# Patient Record
Sex: FEMALE | Race: BLACK OR AFRICAN AMERICAN | Employment: OTHER | URBAN - METROPOLITAN AREA
[De-identification: names, ages, dates, MRNs, and addresses within clinical notes are randomized per-mention and may not be internally consistent; named-entity substitution may affect disease eponyms.]

---

## 2017-01-04 ENCOUNTER — OFFICE VISIT (OUTPATIENT)
Dept: ONCOLOGY | Age: 42
End: 2017-01-04

## 2017-01-04 VITALS
TEMPERATURE: 100.6 F | OXYGEN SATURATION: 97 % | BODY MASS INDEX: 35.34 KG/M2 | HEIGHT: 64 IN | DIASTOLIC BLOOD PRESSURE: 90 MMHG | WEIGHT: 207 LBS | HEART RATE: 104 BPM | SYSTOLIC BLOOD PRESSURE: 147 MMHG | RESPIRATION RATE: 20 BRPM

## 2017-01-04 DIAGNOSIS — C50.511 BREAST CANCER OF LOWER-OUTER QUADRANT OF RIGHT FEMALE BREAST (HCC): Primary | ICD-10-CM

## 2017-01-04 NOTE — MR AVS SNAPSHOT
Visit Information Date & Time Provider Department Dept. Phone Encounter #  
 1/4/2017 11:00 AM Sherwin Westbrook MD [facility] at [address] [place] [address] Rd [id number] Follow-up Instructions Return in about 5 months (around 6/5/2017) for scan results, sand/luz marina. Follow-up and Disposition History Upcoming Health Maintenance Date Due Pneumococcal 19-64 Highest Risk (1 of 3 - PCV13) 3/11/1992 DTaP/Tdap/Td series (1 - Tdap) 3/11/1994 PAP AKA CERVICAL CYTOLOGY 8/1/2016 INFLUENZA AGE 9 TO ADULT 8/1/2016 Allergies as of 1/4/2017  Review Complete On: 1/4/2017 By: Sherwin Westbrook MD  
  
 Severity Noted Reaction Type Reactions Latex  04/15/2014    Itching LATEX CONDOM USE CAUSES ITCHING Current Immunizations  Reviewed on 4/27/2016 Name Date Influenza Vaccine 12/8/2016, 1/1/2016 Not reviewed this visit You Were Diagnosed With   
  
 Codes Comments Breast cancer of lower-outer quadrant of right female breast ([facility]    -  Primary ICD-10-CM: C50.511 ICD-9-CM: 174.5 Vitals BP Pulse Temp Resp Height(growth percentile) Weight(growth percentile) 147/90 (!) 104 (!) 100.6 °F (38.1 °C) (Oral) 20 5' 4\" (1.626 m) 207 lb (93.9 kg) SpO2 BMI OB Status Smoking Status 97% 35.53 kg/m2 Medically Induced Never Smoker Vitals History BMI and BSA Data Body Mass Index Body Surface Area 35.53 kg/m 2 2.06 m 2 Preferred Pharmacy Pharmacy Name Phone [address] [facility] [phone] Your Updated Medication List  
  
   
This list is accurate as of: 1/4/17 12:49 PM.  Always use your most recent med list.  
  
  
  
  
 anastrozole 1 mg tablet Commonly known as:  ARIMIDEX Take 1 Tab by mouth daily. CALTRATE 600+D PLUS MINERALS 600 mg calcium- 800 unit-50 mg Tab Generic drug:  Ca-D3-mag#11-zinc-cupr-man-bor Take 1 Tab by mouth two (2) times a day. chlorpheniramine-HYDROcodone 10-8 mg/5 mL suspension Commonly known as:  Eulene Siemens Take 5 mL by mouth every twelve (12) hours as needed for Cough. Max Daily Amount: 10 mL. diphenhydrAMINE 25 mg capsule Commonly known as:  BENADRYL Take 25 mg by mouth every six (6) hours as needed. FULVESTRANT IM  
by IntraMUSCular route. Indications: every 28 days LORazepam 1 mg tablet Commonly known as:  ATIVAN Take 1 Tab by mouth nightly as needed for Anxiety. multivitamin tablet Commonly known as:  ONE A DAY Take 1 Tab by mouth daily. ondansetron hcl 8 mg tablet Commonly known as:  Dyllan Mater Take 1 Tab by mouth every eight (8) hours as needed for Nausea. * OTHER  
two (2) times a day. * OTHER Disabled [address] VI:G29.881 * Notice: This list has 2 medication(s) that are the same as other medications prescribed for you. Read the directions carefully, and ask your doctor or other care provider to review them with you. Follow-up Instructions Return in about 5 months (around 6/5/2017) for scan results, sand/luz marina. To-Do List   
 06/02/2017 Imaging:  CT CHEST ABD PELV W CONT   
  
 06/02/2017 Imaging:  NM BONE SCAN [facility] BODY Patient Instructions Come see us in 5 months with scans before. Introducing [facility] HE[facility]! Dear Pretty Likes: Thank you for requesting a [employer] account. Our records indicate that you already have an active [employer] account. You can access your account anytime at [email or web address] Did you know that you can access your hospital and ER discharge instructions at any time in [employer])? You can also review all of your test results from your hospital stay or ER visit. Additional Information If you have questions, please visit the Frequently Asked Questions section of the [employer] website at [email or web address] Remember, [employer] is NOT to be used for urgent needs. For medical emergencies, dial 911. Now available from your iPhone and Android! Please provide this summary of care documentation to your next provider. Your primary care clinician is listed as Phys Other. If you have any questions after today's visit, please call [phone].
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
## 2017-03-16 DIAGNOSIS — C50.511 BREAST CANCER OF LOWER-OUTER QUADRANT OF RIGHT FEMALE BREAST (HCC): Primary | ICD-10-CM

## 2017-03-16 RX ORDER — ANASTROZOLE 1 MG/1
1 TABLET ORAL DAILY
Qty: 90 TAB | Refills: 3 | Status: SHIPPED | OUTPATIENT
Start: 2017-03-16 | End: 2018-04-06 | Stop reason: SDUPTHER

## 2017-05-16 ENCOUNTER — HOSPITAL ENCOUNTER (OUTPATIENT)
Dept: NUCLEAR MEDICINE | Age: 42
Discharge: HOME | End: 2017-05-16
Attending: NURSE PRACTITIONER
Payer: MEDICARE

## 2017-05-16 ENCOUNTER — HOSPITAL ENCOUNTER (OUTPATIENT)
Dept: CT IMAGING | Age: 42
Discharge: HOME | End: 2017-05-16
Attending: NURSE PRACTITIONER
Payer: MEDICARE

## 2017-05-16 DIAGNOSIS — C50.511 BREAST CANCER OF LOWER-OUTER QUADRANT OF RIGHT FEMALE BREAST (HCC): ICD-10-CM

## 2017-05-16 PROCEDURE — 78306 BONE IMAGING WHOLE BODY: CPT

## 2017-05-16 PROCEDURE — 74011636320 HC RX REV CODE- 636/320: Performed by: RADIOLOGY

## 2017-05-16 PROCEDURE — 74177 CT ABD & PELVIS W/CONTRAST: CPT

## 2017-05-16 PROCEDURE — 77030003560 HC NDL HUBR BARD -A

## 2017-05-16 RX ORDER — HEPARIN 100 UNIT/ML
500 SYRINGE INTRAVENOUS
Status: DISPENSED | OUTPATIENT
Start: 2017-05-16 | End: 2017-05-17

## 2017-05-16 RX ADMIN — IOPAMIDOL 99 ML: 755 INJECTION, SOLUTION INTRAVENOUS at 14:17

## 2017-05-17 ENCOUNTER — OFFICE VISIT (OUTPATIENT)
Dept: ONCOLOGY | Age: 42
End: 2017-05-17

## 2017-05-17 VITALS
OXYGEN SATURATION: 99 % | TEMPERATURE: 96.8 F | HEART RATE: 86 BPM | HEIGHT: 64 IN | BODY MASS INDEX: 34.79 KG/M2 | SYSTOLIC BLOOD PRESSURE: 137 MMHG | DIASTOLIC BLOOD PRESSURE: 101 MMHG | RESPIRATION RATE: 18 BRPM | WEIGHT: 203.8 LBS

## 2017-05-17 DIAGNOSIS — R05.9 COUGH: ICD-10-CM

## 2017-05-17 DIAGNOSIS — C50.511 BREAST CANCER OF LOWER-OUTER QUADRANT OF RIGHT FEMALE BREAST (HCC): Primary | ICD-10-CM

## 2017-05-17 DIAGNOSIS — F43.10 PTSD (POST-TRAUMATIC STRESS DISORDER): ICD-10-CM

## 2017-05-17 DIAGNOSIS — M25.50 ARTHRALGIA, UNSPECIFIED JOINT: ICD-10-CM

## 2017-05-17 DIAGNOSIS — C50.919 METASTATIC BREAST CANCER (HCC): ICD-10-CM

## 2017-05-17 DIAGNOSIS — R23.2 HOT FLASHES RELATED TO AROMATASE INHIBITOR THERAPY: ICD-10-CM

## 2017-05-17 DIAGNOSIS — F41.9 ANXIETY: ICD-10-CM

## 2017-05-17 DIAGNOSIS — T45.1X5A HOT FLASHES RELATED TO AROMATASE INHIBITOR THERAPY: ICD-10-CM

## 2017-05-17 RX ORDER — ASCORBIC ACID 500 MG
500 TABLET ORAL DAILY
COMMUNITY
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
## 2017-05-17 NOTE — MR AVS SNAPSHOT
Visit Information Date & Time Provider Department Dept. Phone Encounter #  
 5/17/2017 11:30 AM MD Vivienne Gilmannhaverobbi Oncology at [address] [facility] [id number] Follow-up Instructions Return in about 6 months (around 11/17/2017) for 6m luz marina gilbert. Follow-up and Disposition History Your Appointments 11/22/2017  9:30 AM  
ESTABLISHED PATIENT with MD Edil Gilman Oncology at [id number] [place] [address] [address] [facility]) Appt Note: 6m luz marina gilbert [address] [facility], [address] [facility] [address]  
[phone]  
  
   
 [address] [facility], [address] [place] [address] Upcoming Health Maintenance Date Due Pneumococcal 19-64 Highest Risk (1 of 3 - PCV13) 3/11/1992 DTaP/Tdap/Td series (1 - Tdap) 3/11/1994 PAP AKA CERVICAL CYTOLOGY 8/1/2016 INFLUENZA AGE 9 TO ADULT 8/1/2017 Allergies as of 5/17/2017  Review Complete On: 5/17/2017 By: Alina Márquez MD  
  
 Severity Noted Reaction Type Reactions Latex  04/15/2014    Itching LATEX CONDOM USE CAUSES ITCHING Current Immunizations  Reviewed on 4/27/2016 Name Date Influenza Vaccine 12/8/2016, 1/1/2016 Not reviewed this visit You Were Diagnosed With   
  
 Codes Comments Breast cancer of lower-outer quadrant of right female breast ([facility]    -  Primary ICD-10-CM: C50.511 ICD-9-CM: 174.5 Anxiety     ICD-10-CM: F41.9 ICD-9-CM: 300.00 Cough     ICD-10-CM: R05 ICD-9-CM: 786.2 Hot flashes related to aromatase inhibitor therapy     ICD-10-CM: R23.2, T45.1X5A 
ICD-9-CM: 782.62, E933.1 Arthralgia, unspecified joint     ICD-10-CM: M25.50 ICD-9-CM: 719.40 PTSD (post-traumatic stress disorder)     ICD-10-CM: F43.10 ICD-9-CM: 309.81 Metastatic breast cancer ([facility]     ICD-10-CM: C50.919, C79.9 ICD-9-CM: 174.9, 199.1 Vitals BP Pulse Temp Resp Height(growth percentile) Weight(growth percentile) (!) 137/101 86 96.8 °F (36 °C) (Temporal) 18 5' 4\" (1.626 m) 203 lb 12.8 oz (92.4 kg) SpO2 BMI OB Status Smoking Status 99% 34.98 kg/m2 Medically Induced Never Smoker Vitals History BMI and BSA Data Body Mass Index Body Surface Area 34.98 kg/m 2 2.04 m 2 Preferred Pharmacy Pharmacy Name Phone [address] [facility] [phone] Your Updated Medication List  
  
   
This list is accurate as of: 5/17/17 12:37 PM.  Always use your most recent med list.  
  
  
  
  
 anastrozole 1 mg tablet Commonly known as:  ARIMIDEX Take 1 Tab by mouth daily. CALTRATE 600+D PLUS MINERALS 600 mg calcium- 800 unit-50 mg Tab Generic drug:  Ca-D3-mag#11-zinc-cupr-man-bor Take 1 Tab by mouth two (2) times a day. chlorpheniramine-HYDROcodone 10-8 mg/5 mL suspension Commonly known as:  Clifm Chasten Take 5 mL by mouth every twelve (12) hours as needed for Cough. Max Daily Amount: 10 mL. diphenhydrAMINE 25 mg capsule Commonly known as:  BENADRYL Take 25 mg by mouth every six (6) hours as needed. FULVESTRANT IM  
by IntraMUSCular route. Indications: every 28 days LORazepam 1 mg tablet Commonly known as:  ATIVAN Take 1 Tab by mouth nightly as needed for Anxiety. multivitamin tablet Commonly known as:  ONE A DAY Take 1 Tab by mouth daily. ondansetron hcl 8 mg tablet Commonly known as:  Radha Ross Take 1 Tab by mouth every eight (8) hours as needed for Nausea. * OTHER  
two (2) times a day. * OTHER Disabled Placard and License Plate OG:N22.107  
  
 VITAMIN C 500 mg tablet Generic drug:  ascorbic acid (vitamin C) Take 500 mg by mouth daily. * Notice: This list has 2 medication(s) that are the same as other medications prescribed for you. Read the directions carefully, and ask your doctor or other care provider to review them with you. Follow-up Instructions Return in about 6 months (around 11/17/2017) for 6m luz marina gilbert. To-Do List   
 11/13/2017 Imaging:  CT CHEST ABD PELV W CONT   
  
 11/13/2017 Imaging:  NM BONE SCAN [facility] BODY Introducing [facility] SERVICES! Dear Denis Fairbanks: Thank you for requesting a MySQL account. Our records indicate that you already have an active MySQL account. You can access your account anytime at [email or web address] Did you know that you can access your hospital and ER discharge instructions at any time in MySQL? You can also review all of your test results from your hospital stay or ER visit. Additional Information If you have questions, please visit the Frequently Asked Questions section of the MySQL website at [email or web address] Remember, MySQL is NOT to be used for urgent needs. For medical emergencies, dial 911. Now available from your iPhone and Android! Please provide this summary of care documentation to your next provider. Your primary care clinician is listed as Phys Other. If you have any questions after today's visit, please call [phone].

## 2017-12-19 ENCOUNTER — HOSPITAL ENCOUNTER (OUTPATIENT)
Dept: NUCLEAR MEDICINE | Age: 42
Discharge: HOME | End: 2017-12-19
Attending: NURSE PRACTITIONER
Payer: MEDICARE

## 2017-12-19 ENCOUNTER — HOSPITAL ENCOUNTER (OUTPATIENT)
Dept: CT IMAGING | Age: 42
Discharge: HOME | End: 2017-12-19
Attending: NURSE PRACTITIONER
Payer: MEDICARE

## 2017-12-19 DIAGNOSIS — T45.1X5A HOT FLASHES RELATED TO AROMATASE INHIBITOR THERAPY: ICD-10-CM

## 2017-12-19 DIAGNOSIS — R05.9 COUGH: ICD-10-CM

## 2017-12-19 DIAGNOSIS — C50.919 METASTATIC BREAST CANCER (HCC): ICD-10-CM

## 2017-12-19 DIAGNOSIS — R23.2 HOT FLASHES RELATED TO AROMATASE INHIBITOR THERAPY: ICD-10-CM

## 2017-12-19 DIAGNOSIS — F43.10 PTSD (POST-TRAUMATIC STRESS DISORDER): ICD-10-CM

## 2017-12-19 DIAGNOSIS — M25.50 ARTHRALGIA, UNSPECIFIED JOINT: ICD-10-CM

## 2017-12-19 DIAGNOSIS — C50.511 BREAST CANCER OF LOWER-OUTER QUADRANT OF RIGHT FEMALE BREAST (HCC): ICD-10-CM

## 2017-12-19 DIAGNOSIS — F41.9 ANXIETY: ICD-10-CM

## 2017-12-19 PROCEDURE — 78306 BONE IMAGING WHOLE BODY: CPT

## 2017-12-19 PROCEDURE — 74011250636 HC RX REV CODE- 250/636: Performed by: RADIOLOGY

## 2017-12-19 PROCEDURE — 74011636320 HC RX REV CODE- 636/320: Performed by: NURSE PRACTITIONER

## 2017-12-19 PROCEDURE — 74177 CT ABD & PELVIS W/CONTRAST: CPT

## 2017-12-19 PROCEDURE — 77030003560 HC NDL HUBR BARD -A

## 2017-12-19 RX ORDER — HEPARIN 100 UNIT/ML
500 SYRINGE INTRAVENOUS
Status: COMPLETED | OUTPATIENT
Start: 2017-12-19 | End: 2017-12-19

## 2017-12-19 RX ADMIN — SODIUM CHLORIDE, PRESERVATIVE FREE 500 UNITS: 5 INJECTION INTRAVENOUS at 13:48

## 2017-12-19 RX ADMIN — IOPAMIDOL 95 ML: 755 INJECTION, SOLUTION INTRAVENOUS at 13:48

## 2017-12-20 ENCOUNTER — OFFICE VISIT (OUTPATIENT)
Dept: ONCOLOGY | Age: 42
End: 2017-12-20

## 2017-12-20 VITALS
WEIGHT: 213.4 LBS | DIASTOLIC BLOOD PRESSURE: 103 MMHG | HEIGHT: 64 IN | HEART RATE: 82 BPM | OXYGEN SATURATION: 97 % | SYSTOLIC BLOOD PRESSURE: 136 MMHG | RESPIRATION RATE: 18 BRPM | BODY MASS INDEX: 36.43 KG/M2 | TEMPERATURE: 98 F

## 2017-12-20 DIAGNOSIS — C50.919 METASTATIC BREAST CANCER (HCC): Primary | ICD-10-CM

## 2017-12-20 DIAGNOSIS — Z17.0 MALIGNANT NEOPLASM OF LOWER-OUTER QUADRANT OF RIGHT BREAST OF FEMALE, ESTROGEN RECEPTOR POSITIVE (HCC): ICD-10-CM

## 2017-12-20 DIAGNOSIS — C50.511 MALIGNANT NEOPLASM OF LOWER-OUTER QUADRANT OF RIGHT BREAST OF FEMALE, ESTROGEN RECEPTOR POSITIVE (HCC): ICD-10-CM

## 2017-12-20 DIAGNOSIS — R23.2 HOT FLASHES RELATED TO AROMATASE INHIBITOR THERAPY: ICD-10-CM

## 2017-12-20 DIAGNOSIS — F41.9 ANXIETY: ICD-10-CM

## 2017-12-20 DIAGNOSIS — R05.9 COUGH: ICD-10-CM

## 2017-12-20 DIAGNOSIS — M25.50 ARTHRALGIA, UNSPECIFIED JOINT: ICD-10-CM

## 2017-12-20 DIAGNOSIS — F43.10 PTSD (POST-TRAUMATIC STRESS DISORDER): ICD-10-CM

## 2017-12-20 DIAGNOSIS — T45.1X5A HOT FLASHES RELATED TO AROMATASE INHIBITOR THERAPY: ICD-10-CM

## 2017-12-20 NOTE — MR AVS SNAPSHOT
Visit Information Date & Time Provider Department Dept. Phone Encounter #  
 12/20/2017  9:30 AM [facility]ilva [facility] [place] [id number] [phone] Follow-up Instructions Return for 7m luz marina gilbert. Upcoming Health Maintenance Date Due Pneumococcal 19-64 Highest Risk (1 of 3 - PCV13) 3/11/1992 DTaP/Tdap/Td series (1 - Tdap) 3/11/1994 PAP AKA CERVICAL CYTOLOGY 8/1/2016 Influenza Age 5 to Adult 8/1/2017 Allergies as of 12/20/2017  Review Complete On: 12/20/2017 By: Salome Dasilva MD  
  
 Severity Noted Reaction Type Reactions Latex  04/15/2014    Itching LATEX CONDOM USE CAUSES ITCHING Current Immunizations  Reviewed on 4/27/2016 Name Date Influenza Vaccine 11/24/2017, 12/8/2016, 1/1/2016 Not reviewed this visit You Were Diagnosed With   
  
 Codes Comments Metastatic breast cancer ([facility]    -  Primary ICD-10-CM: Y07.411 ICD-9-CM: 174.9 Anxiety     ICD-10-CM: F41.9 ICD-9-CM: 300.00 Hot flashes related to aromatase inhibitor therapy     ICD-10-CM: R23.2, T45.1X5A 
ICD-9-CM: 782.62, E933.1 Arthralgia, unspecified joint     ICD-10-CM: M25.50 ICD-9-CM: 719.40 Cough     ICD-10-CM: R05 ICD-9-CM: 786.2 PTSD (post-traumatic stress disorder)     ICD-10-CM: F43.10 ICD-9-CM: 309.81 Malignant neoplasm of lower-outer quadrant of right breast of female, estrogen receptor positive ([facility]     ICD-10-CM: C50.511, Z17.0 ICD-9-CM: 174.5, V86.0 Vitals BP Pulse Temp Resp Height(growth percentile) Weight(growth percentile) (!) 136/103 82 98 °F (36.7 °C) (Temporal) 18 5' 4\" (1.626 m) 213 lb 6.4 oz (96.8 kg) SpO2 BMI OB Status Smoking Status 97% 36.63 kg/m2 Medically Induced Never Smoker Vitals History BMI and BSA Data Body Mass Index Body Surface Area  
 36.63 kg/m 2 2.09 m 2 [phone] Your Updated Medication List  
  
   
This list is accurate as of: 12/20/17 10:38 AM.  Always use your most recent med list.  
  
  
  
  
 anastrozole 1 mg tablet Commonly known as:  ARIMIDEX Take 1 Tab by mouth daily. CALTRATE 600+D PLUS MINERALS 600 mg calcium- 800 unit-50 mg Tab Generic drug:  fyu-A1-jgl95-zinc--cheko-bor Take 1 Tab by mouth two (2) times a day. chlorpheniramine-HYDROcodone 10-8 mg/5 mL suspension Commonly known as:  Ivory Cheeks Take 5 mL by mouth every twelve (12) hours as needed for Cough. Max Daily Amount: 10 mL. diphenhydrAMINE 25 mg capsule Commonly known as:  BENADRYL Take 25 mg by mouth every six (6) hours as needed. FULVESTRANT IM  
by IntraMUSCular route. Indications: every 28 days LORazepam 1 mg tablet Commonly known as:  ATIVAN Take 1 Tab by mouth nightly as needed for Anxiety. multivitamin tablet Commonly known as:  ONE A DAY Take 1 Tab by mouth daily. ondansetron hcl 8 mg tablet Commonly known as:  Lutricia Rye Take 1 Tab by mouth every eight (8) hours as needed for Nausea. * OTHER  
two (2) times a day. * OTHER Disabled Placard and License Plate IX:Y10.152  
  
 VITAMIN C 500 mg tablet Generic drug:  ascorbic acid (vitamin C) Take 500 mg by mouth daily. * Notice: This list has 2 medication(s) that are the same as other medications prescribed for you. Read the directions carefully, and ask your doctor or other care provider to review them with you. Follow-up Instructions Return for 7m luz marina gilbert. To-Do List   
 07/10/2018 Imaging:  CT CHEST ABD PELV W CONT   
  
 07/10/2018 Imaging:  NM BONE SCAN [facility] BODY Introducing [facility] & HEALTH SERVICES! Dear Kike Church: Thank you for requesting a [employer] account.   Our records indicate that you already have an active [employer] account. You can access your account anytime at [email or web address] Did you know that you can access your hospital and ER discharge instructions at any time in [employer]? You can also review all of your test results from your hospital stay or ER visit. Additional Information If you have questions, please visit the Frequently Asked Questions section of the [employer] website at [email or web address] Remember, [employer] is NOT to be used for urgent needs. For medical emergencies, dial 911. Now available from your iPhone and Android! Please provide this summary of care documentation to your next provider. Your primary care clinician is listed as Phys Other. If you have any questions after today's visit, please call [phone].
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
## 2018-04-09 RX ORDER — ANASTROZOLE 1 MG/1
TABLET ORAL
Qty: 90 TAB | Refills: 3 | Status: SHIPPED | OUTPATIENT
Start: 2018-04-09 | End: 2019-03-03 | Stop reason: SDUPTHER

## 2018-06-27 ENCOUNTER — TELEPHONE (OUTPATIENT)
Dept: ONCOLOGY | Age: 43
End: 2018-06-27

## 2018-06-27 DIAGNOSIS — C50.919 METASTATIC BREAST CANCER (HCC): ICD-10-CM

## 2018-06-27 DIAGNOSIS — C50.511 MALIGNANT NEOPLASM OF LOWER-OUTER QUADRANT OF RIGHT FEMALE BREAST, UNSPECIFIED ESTROGEN RECEPTOR STATUS (HCC): Primary | ICD-10-CM

## 2018-06-27 NOTE — TELEPHONE ENCOUNTER
Patient would like to know if she needs a mammogram. \"local oncologist recommended a mammogram, what does Dr. Kristi Bynum think? \"# [phone]

## 2018-06-27 NOTE — TELEPHONE ENCOUNTER
Called the patient and verified ID x 2. Informed the patient that Dr. Hortensia Zapata is agreeable to ordering a mammogram and that an order will be placed for a mammogram.  The patient stated that she has a bone scan and CT scheduled on 8/20/18 and that she will be driving from [facility] on 8/20/18 and requested that the mammogram be scheduled on 8/20/18 around 4:00 pm or on the earliest appointment time on 8/21/18 so that the results will be available for her appointment with Dr. Hortensia Zapata on 8/22/18. Informed the patient that this office will call the schedulers and inform them of the above information. The patient verbalized understanding and denied any further questions or concerns.

## 2018-08-20 ENCOUNTER — HOSPITAL ENCOUNTER (OUTPATIENT)
Dept: MAMMOGRAPHY | Age: 43
Discharge: HOME | End: 2018-08-20
Attending: INTERNAL MEDICINE
Payer: MEDICARE

## 2018-08-20 ENCOUNTER — HOSPITAL ENCOUNTER (OUTPATIENT)
Dept: CT IMAGING | Age: 43
Discharge: HOME | End: 2018-08-20
Attending: NURSE PRACTITIONER
Payer: MEDICARE

## 2018-08-20 ENCOUNTER — HOSPITAL ENCOUNTER (OUTPATIENT)
Dept: NUCLEAR MEDICINE | Age: 43
Discharge: HOME | End: 2018-08-20
Attending: NURSE PRACTITIONER
Payer: MEDICARE

## 2018-08-20 DIAGNOSIS — M25.50 ARTHRALGIA, UNSPECIFIED JOINT: ICD-10-CM

## 2018-08-20 DIAGNOSIS — T45.1X5A HOT FLASHES RELATED TO AROMATASE INHIBITOR THERAPY: ICD-10-CM

## 2018-08-20 DIAGNOSIS — C50.919 METASTATIC BREAST CANCER (HCC): ICD-10-CM

## 2018-08-20 DIAGNOSIS — F41.9 ANXIETY: ICD-10-CM

## 2018-08-20 DIAGNOSIS — C50.511 MALIGNANT NEOPLASM OF LOWER-OUTER QUADRANT OF RIGHT BREAST OF FEMALE, ESTROGEN RECEPTOR POSITIVE (HCC): ICD-10-CM

## 2018-08-20 DIAGNOSIS — R23.2 HOT FLASHES RELATED TO AROMATASE INHIBITOR THERAPY: ICD-10-CM

## 2018-08-20 DIAGNOSIS — F43.10 PTSD (POST-TRAUMATIC STRESS DISORDER): ICD-10-CM

## 2018-08-20 DIAGNOSIS — Z17.0 MALIGNANT NEOPLASM OF LOWER-OUTER QUADRANT OF RIGHT BREAST OF FEMALE, ESTROGEN RECEPTOR POSITIVE (HCC): ICD-10-CM

## 2018-08-20 DIAGNOSIS — C50.511 MALIGNANT NEOPLASM OF LOWER-OUTER QUADRANT OF RIGHT FEMALE BREAST, UNSPECIFIED ESTROGEN RECEPTOR STATUS (HCC): ICD-10-CM

## 2018-08-20 DIAGNOSIS — R05.9 COUGH: ICD-10-CM

## 2018-08-20 PROCEDURE — 78306 BONE IMAGING WHOLE BODY: CPT

## 2018-08-20 PROCEDURE — 74177 CT ABD & PELVIS W/CONTRAST: CPT

## 2018-08-20 PROCEDURE — 77030003560 HC NDL HUBR BARD -A

## 2018-08-20 PROCEDURE — 77066 DX MAMMO INCL CAD BI: CPT

## 2018-08-20 PROCEDURE — 74011636320 HC RX REV CODE- 636/320: Performed by: RADIOLOGY

## 2018-08-20 RX ORDER — HEPARIN 100 UNIT/ML
SYRINGE INTRAVENOUS
Status: DISPENSED
Start: 2018-08-20 | End: 2018-08-21

## 2018-08-20 RX ORDER — HEPARIN 100 UNIT/ML
500 SYRINGE INTRAVENOUS
Status: ACTIVE | OUTPATIENT
Start: 2018-08-20 | End: 2018-08-21

## 2018-08-20 RX ADMIN — IOPAMIDOL 100 ML: 755 INJECTION, SOLUTION INTRAVENOUS at 15:21

## 2018-08-22 ENCOUNTER — OFFICE VISIT (OUTPATIENT)
Dept: ONCOLOGY | Age: 43
End: 2018-08-22

## 2018-08-22 VITALS
OXYGEN SATURATION: 98 % | WEIGHT: 214 LBS | DIASTOLIC BLOOD PRESSURE: 111 MMHG | BODY MASS INDEX: 36.54 KG/M2 | HEIGHT: 64 IN | RESPIRATION RATE: 18 BRPM | HEART RATE: 79 BPM | SYSTOLIC BLOOD PRESSURE: 150 MMHG | TEMPERATURE: 97.5 F

## 2018-08-22 DIAGNOSIS — M25.50 ARTHRALGIA, UNSPECIFIED JOINT: ICD-10-CM

## 2018-08-22 DIAGNOSIS — C50.511 MALIGNANT NEOPLASM OF LOWER-OUTER QUADRANT OF RIGHT BREAST OF FEMALE, ESTROGEN RECEPTOR POSITIVE (HCC): ICD-10-CM

## 2018-08-22 DIAGNOSIS — F43.10 PTSD (POST-TRAUMATIC STRESS DISORDER): ICD-10-CM

## 2018-08-22 DIAGNOSIS — R05.9 COUGH: ICD-10-CM

## 2018-08-22 DIAGNOSIS — R23.2 HOT FLASHES RELATED TO AROMATASE INHIBITOR THERAPY: ICD-10-CM

## 2018-08-22 DIAGNOSIS — F41.9 ANXIETY: ICD-10-CM

## 2018-08-22 DIAGNOSIS — T45.1X5A HOT FLASHES RELATED TO AROMATASE INHIBITOR THERAPY: ICD-10-CM

## 2018-08-22 DIAGNOSIS — C50.919 METASTATIC BREAST CANCER (HCC): Primary | ICD-10-CM

## 2018-08-22 DIAGNOSIS — Z17.0 MALIGNANT NEOPLASM OF LOWER-OUTER QUADRANT OF RIGHT BREAST OF FEMALE, ESTROGEN RECEPTOR POSITIVE (HCC): ICD-10-CM

## 2018-08-22 PROBLEM — E66.01 SEVERE OBESITY (BMI 35.0-39.9): Status: ACTIVE | Noted: 2018-08-22

## 2018-08-22 RX ORDER — FLUCONAZOLE 150 MG/1
150 TABLET ORAL
Qty: 2 TAB | Refills: 1 | Status: SHIPPED | OUTPATIENT
Start: 2018-08-22 | End: 2018-08-26
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
## 2018-08-22 NOTE — MR AVS SNAPSHOT
[address] 
 
 
 301 [facility], [address] 
[phone] Patient: Clau Padilla MRN: [id number] IJP:[id number] Visit Information Date & Time Provider Department Dept. Phone Encounter #  
 8/22/2018  8:00 AM Zachery Maier NP 41 [facility] [phone] [id number] Follow-up Instructions Return for 1 yr fu, luz marina. Follow-up and Disposition History Your Appointments 8/14/2019  9:30 AM  
Follow Up with Lindsey Vale MD  
[facility] [address]) Appt Note: 1 yr fu, Neftaly Hanson 301 [facility], [address] [place] [address] [id number]  
[phone]  
  
   
 301 [facility], [address] [place] [address] Upcoming Health Maintenance Date Due Pneumococcal 19-64 Highest Risk (1 of 3 - PCV13) 3/11/1992 DTaP/Tdap/Td series (1 - Tdap) 3/11/1994 PAP AKA CERVICAL CYTOLOGY 8/1/2016 MEDICARE YEARLY EXAM 3/14/2018 Influenza Age 5 to Adult 8/1/2018 Allergies as of 8/22/2018  Review Complete On: 8/22/2018 By: Zachery Maier NP Severity Noted Reaction Type Reactions Latex  04/15/2014    Itching LATEX CONDOM USE CAUSES ITCHING Current Immunizations  Reviewed on 4/27/2016 Name Date Influenza Vaccine 11/24/2017, 12/8/2016, 1/1/2016 Not reviewed this visit You Were Diagnosed With   
  
 Codes Comments Metastatic breast cancer ([facility] [address]    -  Primary ICD-10-CM: C63.076 ICD-9-CM: 174.9 Anxiety     ICD-10-CM: F41.9 ICD-9-CM: 300.00 Hot flashes related to aromatase inhibitor therapy     ICD-10-CM: R23.2, T45.1X5A 
ICD-9-CM: 782.62, E933.1 Arthralgia, unspecified joint     ICD-10-CM: M25.50 ICD-9-CM: 719.40 Cough     ICD-10-CM: R05 ICD-9-CM: 786.2 PTSD (post-traumatic stress disorder)     ICD-10-CM: F43.10 ICD-9-CM: 309.81   
 Malignant neoplasm of lower-outer quadrant of right breast of female, estrogen receptor positive ([facility] [address]     ICD-10-CM: C50.511, Z17.0 ICD-9-CM: 174.5, V86.0 Vitals BP Pulse Temp Resp Height(growth percentile) Weight(growth percentile) (!) 150/111 79 97.5 °F (36.4 °C) (Temporal) 18 5' 4\" (1.626 m) 214 lb (97.1 kg) SpO2 BMI OB Status Smoking Status 98% 36.73 kg/m2 Medically Induced Never Smoker Vitals History BMI and BSA Data Body Mass Index Body Surface Area  
 36.73 kg/m 2 2.09 m 2 Preferred Pharmacy Pharmacy Name Phone [facility] [place], [address] [place] AT [facility] [phone] Your Updated Medication List  
  
   
This list is accurate as of 8/22/18  9:09 AM.  Always use your most recent med list.  
  
  
  
  
 anastrozole 1 mg tablet Commonly known as:  ARIMIDEX TAKE 1 TABLET DAILY CALTRATE 600+D PLUS MINERALS 600 mg calcium- 800 unit-50 mg Tab Generic drug:  yxe-T3-npi45-zinc--cheko-bor Take 1 Tab by mouth two (2) times a day. chlorpheniramine-HYDROcodone 10-8 mg/5 mL suspension Commonly known as:  Hannah Peace Take 5 mL by mouth every twelve (12) hours as needed for Cough. Max Daily Amount: 10 mL. diphenhydrAMINE 25 mg capsule Commonly known as:  BENADRYL Take 25 mg by mouth every six (6) hours as needed. fluconazole 150 mg tablet Commonly known as:  DIFLUCAN Take 1 Tab by mouth every seventy-two (72) hours for 2 doses. FDA advises cautious prescribing of oral fluconazole in pregnancy. FULVESTRANT IM  
by IntraMUSCular route. Indications: every 28 days LORazepam 1 mg tablet Commonly known as:  ATIVAN Take 1 Tab by mouth nightly as needed for Anxiety. multivitamin tablet Commonly known as:  ONE A DAY Take 1 Tab by mouth daily. ondansetron hcl 8 mg tablet Commonly known as:  Masoud Germain  
 Take 1 Tab by mouth every eight (8) hours as needed for Nausea. OTHER Disabled Placard and License Plate [place]:K05.268  
  
 VITAMIN C 500 mg tablet Generic drug:  ascorbic acid (vitamin C) Take 500 mg by mouth daily. Prescriptions Sent to Pharmacy Refills  
 fluconazole (DIFLUCAN) 150 mg tablet 1 Sig: Take 1 Tab by mouth every seventy-two (72) hours for 2 doses. FDA advises cautious prescribing of oral fluconazole in pregnancy. Class: Normal  
 Pharmacy: [facility] [address] 500 [facility] #: [phone] Route: Oral  
  
Follow-up Instructions Return for 1 yr luz marina gilbert. To-Do List   
 08/20/2019 Imaging:  CT CHEST ABD PELV W CONT   
  
 08/20/2019 Imaging:  IAN MAMMO BI DX INCL CAD   
  
 08/20/2019 Imaging:  NM BONE SCAN [facility] BODY Introducing [facility] SERVICES! Dear Rene Draft: Thank you for requesting a [employer] account. Our records indicate that you already have an active [employer] account. You can access your account anytime at [email or web address] Did you know that you can access your hospital and ER discharge instructions at any time in [employer]? You can also review all of your test results from your hospital stay or ER visit. Additional Information If you have questions, please visit the Frequently Asked Questions section of the [employer] website at [email or web address] Remember, [employer] is NOT to be used for urgent needs. For medical emergencies, dial 911. Now available from your iPhone and Android! Please provide this summary of care documentation to your next provider. Your primary care clinician is listed as Phys Other. If you have any questions after today's visit, please call [phone].

## 2019-03-04 RX ORDER — ANASTROZOLE 1 MG/1
TABLET ORAL
Qty: 90 TAB | Refills: 3 | Status: SHIPPED | OUTPATIENT
Start: 2019-03-04